# Patient Record
Sex: MALE | Race: BLACK OR AFRICAN AMERICAN | Employment: FULL TIME | URBAN - METROPOLITAN AREA
[De-identification: names, ages, dates, MRNs, and addresses within clinical notes are randomized per-mention and may not be internally consistent; named-entity substitution may affect disease eponyms.]

---

## 2019-05-24 ENCOUNTER — OFFICE VISIT (OUTPATIENT)
Dept: INTERNAL MEDICINE CLINIC | Age: 24
End: 2019-05-24

## 2019-05-24 VITALS
RESPIRATION RATE: 18 BRPM | BODY MASS INDEX: 27.4 KG/M2 | WEIGHT: 185 LBS | SYSTOLIC BLOOD PRESSURE: 120 MMHG | HEART RATE: 69 BPM | OXYGEN SATURATION: 99 % | TEMPERATURE: 98.1 F | HEIGHT: 69 IN | DIASTOLIC BLOOD PRESSURE: 70 MMHG

## 2019-05-24 DIAGNOSIS — M79.652 LEFT THIGH PAIN: Primary | ICD-10-CM

## 2019-05-24 RX ORDER — HYDROCODONE BITARTRATE AND ACETAMINOPHEN 5; 325 MG/1; MG/1
1 TABLET ORAL
Qty: 12 TAB | Refills: 0 | Status: SHIPPED | OUTPATIENT
Start: 2019-05-24 | End: 2019-05-27

## 2019-05-24 NOTE — PROGRESS NOTES
Chief Complaint   Patient presents with   [facility] Establish Care    Leg Swelling     left     1. Have you been to the ER, urgent care clinic since your last visit? Hospitalized since your last visit? No    2. Have you seen or consulted any other health care providers outside of the [address] [place] [address] since your last visit? Include any pap smears or colon screening.  No

## 2019-05-24 NOTE — PROGRESS NOTES
HISTORY OF PRESENT ILLNESS  Codey Calderon is a 26 y.o. male. HPI Mr. Sally Irvin is here for c/o left thigh pain for the past 2 weeks. He had been playing basketball several weeks ago and recalls feeling an abnormal sensation in his left thigh. He describes as a \"weird\" feeling. He has been seen at the ER several times and was seen at [facility] yesterday and was advised that he was going to be referred to have his knee aspirated. We contacted [address] [place] [address] for further information, but they stated they did not have any notes or orders from his visit yesterday. He is still taking naprosyn. He had been given Norco also. He does say the pain has caused him to wake up at night. CT LOWER EXT LT W/ CONTRAST5/16/2019  [employer]  Result Impression   Low density is seen anterior to the left femur, probably within the vastus musculature, perhaps in the vastus intermedius. This may suggest edema which could be seen with muscular injury. Subperiosteal collection remains within the differential. There is no fracture. Consider further evaluation with femur MRI. Thank you for enabling us to participate in the care of this patient. Signed By: Sheila Carver MD on 5/16/2019 7:12 AM       2019  May  MR THIGH WO IV CON LEFT5/16/2019  [employer]  Result Impression     Extensive edema in the vastus intermedialis muscle with surrounding fluid, likely a low to moderate grade muscle strain. However long length of muscle involvement can be associated with longer duration of disability.  -Mild edema in the rectus femoris and vastus lateralis as well, likely low-grade strain. -Given possible insidious onset, delayed onset muscle soreness may also be considered.  -Consider imaging follow-up if warranted.     Signed By: Scotty Moran MD on 5/16/2019 10:43 AM                             Lower Extremity Venous Report     Name: Maya Martinez           Study Date: 05/20/2019 08:05 RUBY  MRN: [id number]                       Patient Location: DAYA^ST25^EF68  : 1995                    Age: 26 yrs  Gender: Male                       Account #: [de-identified]  Reason For Study: left thigh pain  Ordering Physician: Dori Dick  Performed By: Shaun Knutson, KOBIT     Interpretation Summary  No evidence of acute superficial or deep vein thrombosis noted in the left  lower extremity.     No evidence of deep vein thrombosis in the contralateral/right common femoral  vein.     _____________________________________________________________________________      Review of Systems   Constitutional: Negative. Respiratory: Negative. Cardiovascular: Negative. Musculoskeletal: Positive for myalgias (left thigh pain). Neurological: Negative. Psychiatric/Behavioral: Negative. Physical Exam   Constitutional: He appears well-developed and well-nourished. No distress. Cardiovascular: Normal rate. Pulmonary/Chest: Effort normal.   Musculoskeletal:        Legs:    Visit Vitals  /70 (BP 1 Location: Left arm, BP Patient Position: Sitting)   Pulse 69   Temp 98.1 °F (36.7 °C) (Oral)   Resp 18   Ht 5' 9\" (1.753 m)   Wt 185 lb (83.9 kg)   SpO2 99%   BMI 27.32 kg/m²       ASSESSMENT and PLAN    ICD-10-CM ICD-9-CM    1. Left thigh pain M79.652 729.5 HYDROcodone-acetaminophen (NORCO) 5-325 mg per tablet   advised him there really is little more I can do at a primary care office with all the testing he already has done. Ortho is waiting for insurance approval for an aspiration. Pt verbalized understanding of their condition and diagnoses, treatment plan,  as well as side effects of any new medications prescribed.